# Patient Record
Sex: FEMALE | URBAN - METROPOLITAN AREA
[De-identification: names, ages, dates, MRNs, and addresses within clinical notes are randomized per-mention and may not be internally consistent; named-entity substitution may affect disease eponyms.]

---
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
## 2024-08-13 ENCOUNTER — OFFICE VISIT (OUTPATIENT)
Dept: INTERNAL MEDICINE CLINIC | Facility: CLINIC | Age: 19
End: 2024-08-13

## 2024-08-13 VITALS
HEART RATE: 67 BPM | HEIGHT: 64 IN | OXYGEN SATURATION: 99 % | DIASTOLIC BLOOD PRESSURE: 60 MMHG | BODY MASS INDEX: 19.81 KG/M2 | WEIGHT: 116 LBS | SYSTOLIC BLOOD PRESSURE: 120 MMHG

## 2024-08-13 DIAGNOSIS — Z00.00 WELLNESS EXAMINATION: Primary | ICD-10-CM

## 2024-08-13 DIAGNOSIS — F41.1 GAD (GENERALIZED ANXIETY DISORDER): ICD-10-CM

## 2024-08-13 RX ORDER — SERTRALINE HYDROCHLORIDE 25 MG/1
25 TABLET, FILM COATED ORAL DAILY
Qty: 30 TABLET | Refills: 11 | Status: SHIPPED | OUTPATIENT
Start: 2024-08-13

## 2024-08-13 RX ORDER — NORGESTIMATE AND ETHINYL ESTRADIOL 0.25-0.035
1 KIT ORAL DAILY
Qty: 28 TABLET | Refills: 12 | Status: SHIPPED | OUTPATIENT
Start: 2024-08-13 | End: 2025-08-13

## 2024-08-13 NOTE — PROGRESS NOTES
Subjective:   Leslie Ziegler is a 19 year old female who presents for Physical     Patient here for a wellness examination and fu on contraception - was on oral contraception and stopped rx 6 months ago.Had some depressive symptoms with the pill.  Still struggles with relationship with food but not doing counseling currently.  Weight is quite stable.  History/Other:    Chief Complaint Reviewed and Verified  No Further Nursing Notes to   Review  Medications Reviewed  Problem List Reviewed         Tobacco:  Non smoker    Current Outpatient Medications   Medication Sig Dispense Refill    Clindamycin Phosphate 1 % External Gel APPLY TOPICALLY AS SPOT TREATMENT TO ACNE ON FACE, CHEST, AND BACK EVERY MORNING.      TRI-LO-JAMA 0.18/0.215/0.25 MG-25 MCG Oral Tab TAKE 1 TABLET BY MOUTH ONCE A DAY REFER TO PRACTICE HANDOUT FOR SPECIFIC INSTRUCTIONS. (Patient not taking: Reported on 8/13/2024)      spironolactone 50 MG Oral Tab Take 1 tablet (50 mg total) by mouth 3 (three) times daily. (Patient not taking: Reported on 8/13/2024)      Tretinoin 0.1 % External Cream APPLY TO AFFECTED AREA AT BEDTIME TO FACE. REFER TO PRACTICE HANDOUT FOR SPECIFIC INSTRUCTIONS.           Review of Systems:  Review of Systems   Gastrointestinal:         Gerd   Skin:         Mild acne vulgaris     Psychiatric/Behavioral:  The patient is nervous/anxious.          Objective:   /60   Pulse 67   Ht 5' 4\" (1.626 m)   Wt 116 lb (52.6 kg)   SpO2 99%   BMI 19.91 kg/m²  Estimated body mass index is 19.91 kg/m² as calculated from the following:    Height as of this encounter: 5' 4\" (1.626 m).    Weight as of this encounter: 116 lb (52.6 kg).  Physical Exam  Constitutional:       Comments: thin   HENT:      Right Ear: Ear canal normal.      Left Ear: Ear canal normal.   Eyes:      General: No scleral icterus.     Pupils: Pupils are equal, round, and reactive to light.   Neck:      Comments: No thyromegaly  Cardiovascular:      Rate and  Rhythm: Normal rate and regular rhythm.   Pulmonary:      Effort: Pulmonary effort is normal.      Breath sounds: Normal breath sounds.   Abdominal:      General: Abdomen is flat.      Tenderness: There is no abdominal tenderness.   Musculoskeletal:      Cervical back: Neck supple.      Right lower leg: No edema.      Left lower leg: No edema.   Lymphadenopathy:      Cervical: No cervical adenopathy.   Skin:     Comments: Non cystic acne   Neurological:      General: No focal deficit present.      Mental Status: She is alert.   Psychiatric:         Thought Content: Thought content normal.           Assessment & Plan:   1. Wellness examination (Primary) essentially normal examination  2. Malik -  Sertraline 25 mg      No follow-ups on file.    Emilie Zaidi MD, 8/13/2024, 3:26 PM

## 2024-11-25 ENCOUNTER — OFFICE VISIT (OUTPATIENT)
Dept: INTERNAL MEDICINE CLINIC | Facility: CLINIC | Age: 19
End: 2024-11-25

## 2024-11-25 VITALS
SYSTOLIC BLOOD PRESSURE: 110 MMHG | OXYGEN SATURATION: 99 % | DIASTOLIC BLOOD PRESSURE: 64 MMHG | WEIGHT: 116 LBS | BODY MASS INDEX: 19.81 KG/M2 | HEIGHT: 64 IN | RESPIRATION RATE: 15 BRPM | HEART RATE: 81 BPM

## 2024-11-25 DIAGNOSIS — L70.9 ACNE, UNSPECIFIED ACNE TYPE: Primary | ICD-10-CM

## 2024-11-25 RX ORDER — SPIRONOLACTONE 25 MG/1
25 TABLET ORAL DAILY
Qty: 90 TABLET | Refills: 0 | Status: SHIPPED | OUTPATIENT
Start: 2024-11-25

## 2024-11-25 RX ORDER — TRETINOIN 1 MG/G
1 CREAM TOPICAL NIGHTLY
Qty: 45 G | Refills: 0 | Status: SHIPPED | OUTPATIENT
Start: 2024-11-25

## 2024-11-26 NOTE — PROGRESS NOTES
Leslie Ziegler is a 19 year old femalewho presents with   Chief Complaint   Patient presents with    Acne     Pt presents to clinic for c/o acne flaring up.. Would like refill on tretinoin.     HPI:   Reports long history of facial acne and skin gets \"inflamed\".  No longer gets acne on upper back.    Had been on birth control in the last year or so but did not like that.  Was given try lo Anabel birth control 4/2023 but did not like being on it.  Was given another OCP-Sprintec monophasic -August/2024 but has not started it yet.    No exposure to new skin/aundry products or chemicals.   Has seen a dermatologist in the past.  Has tried: Clindamycin 1% external gel,  spironolactone, OCPs, Rx-Tretinoin and OTCs.  Skin seems to get little better in the summer    Not currently on any outpatient prescriptions   Past medical history- Anxiety- not currently on Sertraline   Social History:  Tobacco/Alcohol use not on file     REVIEW OF SYSTEMS:   GENERAL HEALTH: feels well otherwise; no fever or malaise; no recent viral infection/illness  SKIN: No pruritus, no drainage;      EXAM:   /64   Pulse 81   Resp 15   Ht 5' 4\" (1.626 m)   Wt 116 lb (52.6 kg)   LMP 11/18/2024   SpO2 99%   BMI 19.91 kg/m²   GENERAL: well developed, well nourished,in no apparent distress  SKIN: Rash/lesion(s): Fine scattered acne primarily on cheeks minimally on forehead, covered with make-up-limiting exam; no bruising, no unusual swelling or cystic appearance, no crusting, no drainage; no obvious s/s secondary infection  NECK: no submandibular lymphadenopathy  LUNGS: Nonlabored  EXTREMITIES: no cyanosis, clubbing or edema    ASSESSMENT AND PLAN:   Leslie Ziegler is a 19 year old female who presents with   Chief Complaint   Patient presents with    Acne     Pt presents to clinic for c/o acne flaring up.. Would like refill on tretinoin.       ASSESSMENT:  Encounter Diagnosis   Name Primary?    Acne, unspecified acne type Yes   Facial  acne      PLAN:  Requested Prescriptions     Signed Prescriptions Disp Refills    spironolactone 25 MG Oral Tab 90 tablet 0     Sig: Take 1 tablet (25 mg total) by mouth daily.    Tretinoin 0.1 % External Cream 45 g 0     Sig: Apply 1 Tube topically nightly.     Discussed the potent nature of tretinoin, can mix with preferred facial lotion at bedtime at one-to-one ratio or every other day.  Discussed trying spironolactone until the end of the year.  And to reconsider the alternate birth control prescribed in August/2024      The patient indicates understanding of these issues and agrees to the plan.

## 2025-01-30 ENCOUNTER — PATIENT MESSAGE (OUTPATIENT)
Dept: INTERNAL MEDICINE CLINIC | Facility: CLINIC | Age: 20
End: 2025-01-30

## 2025-01-30 RX ORDER — TRETINOIN 1 MG/G
1 CREAM TOPICAL NIGHTLY
Qty: 45 G | Refills: 0 | Status: SHIPPED | OUTPATIENT
Start: 2025-01-30

## 2025-03-13 RX ORDER — TRETINOIN 1 MG/G
1 CREAM TOPICAL NIGHTLY
Qty: 45 G | Refills: 0 | Status: SHIPPED | OUTPATIENT
Start: 2025-03-13

## 2025-05-12 RX ORDER — TRETINOIN 1 MG/G
1 CREAM TOPICAL NIGHTLY
Qty: 20 G | Refills: 1 | Status: SHIPPED | OUTPATIENT
Start: 2025-05-12

## (undated) NOTE — LETTER
Date: 8/13/2024    Patient Name: Leslie Ziegler          To Whom it may concern:    This letter has been written at the patient's request. The above patient was seen at [facility] for treatment of a medical condition.    This patient wants to opt out of her meal plan through her [employer] and rather prepare and her own meals that she on her own      Please feel free to contact our office with any other questions regarding this matter.       Sincerely,      Emilie Zaidi MD